# Patient Record
Sex: FEMALE | Employment: FULL TIME | URBAN - METROPOLITAN AREA
[De-identification: names, ages, dates, MRNs, and addresses within clinical notes are randomized per-mention and may not be internally consistent; named-entity substitution may affect disease eponyms.]

---

## 2021-12-22 ENCOUNTER — OFFICE VISIT (OUTPATIENT)
Dept: FAMILY MEDICINE | Facility: CLINIC | Age: 21
End: 2021-12-22
Payer: COMMERCIAL

## 2021-12-22 VITALS
HEART RATE: 74 BPM | OXYGEN SATURATION: 98 % | DIASTOLIC BLOOD PRESSURE: 87 MMHG | TEMPERATURE: 98.2 F | SYSTOLIC BLOOD PRESSURE: 133 MMHG

## 2021-12-22 DIAGNOSIS — S61.310D LACERATION OF RIGHT INDEX FINGER WITHOUT FOREIGN BODY WITH DAMAGE TO NAIL, SUBSEQUENT ENCOUNTER: Primary | ICD-10-CM

## 2021-12-22 PROCEDURE — 99203 OFFICE O/P NEW LOW 30 MIN: CPT | Performed by: FAMILY MEDICINE

## 2021-12-22 NOTE — PROGRESS NOTES
Assessment:       Laceration of right index finger without foreign body with damage to nail, subsequent encounter           Plan:     Patient with a laceration of the right index finger with some damage to her nail that was glued last evening.  It does appear to be that the wound is healing fairly nicely and appears to be in place and is not gaping or open.  Wound cleansed and rebandaged with a pressure dressing.  No further treatment needed to be done.      Subjective:       21 year old female who sustained a laceration on the tip of her right index finger last evening in which it was repaired with superficial skin glue at the [facility] emergency department last night presents for evaluation because there is a small area that has started bleeding.  She cut herself with an immersion  and it did cut off part of her acrylic nail and she sustained a flap incision of her finger.  No other complaints or concerns today.    There is no problem list on file for this patient.      No past medical history on file.    No past surgical history on file.    No current outpatient medications on file.     No current facility-administered medications for this visit.       No Known Allergies    No family history on file.    Social History     Socioeconomic History     Marital status: Single     Spouse name: None     Number of children: None     Years of education: None     Highest education level: None   Occupational History     None   Tobacco Use     Smoking status: Never Smoker     Smokeless tobacco: Never Used   Substance and Sexual Activity     Alcohol use: None     Drug use: None     Sexual activity: None   Other Topics Concern     None   Social History Narrative     None     Social Determinants of Health     Financial Resource Strain: Not on file   Food Insecurity: Not on file   Transportation Needs: Not on file   Physical Activity: Not on file   Stress: Not on file   Social Connections: Not on file   Intimate  Partner Violence: Not on file   Housing Stability: Not on file         Review of Systems  Pertinent items are noted in HPI.      Objective:     /87 (BP Location: Right arm, Patient Position: Sitting, Cuff Size: Adult Regular)   Pulse 74   Temp 98.2  F (36.8  C) (Oral)   SpO2 98%      General appearance: alert, appears stated age and cooperative  Extremities: Right index finger examined and she has a 1 cm half-moon shaped flap incision on the tip of her right index finger.  It is not actively bleeding.  It is not gaping.  Flap appears to be glued in place and there is a small amount of dried blood next to the wound.      This note has been dictated using voice recognition software. Any grammatical or context distortions are unintentional and inherent to the software

## 2021-12-22 NOTE — LETTER
December 22, 2021      Tomasa Uriostegui  [address] [facility] [address]        To Whom It May Concern:    Tomasa Uriostegui  was seen on 12/22/2021.  Please excuse her on 12/22/2021  until due to injury.        Sincerely,        Jenny Rodarte MD